# Patient Record
Sex: MALE | URBAN - METROPOLITAN AREA
[De-identification: names, ages, dates, MRNs, and addresses within clinical notes are randomized per-mention and may not be internally consistent; named-entity substitution may affect disease eponyms.]

---

## 2019-02-26 ENCOUNTER — IMPORTED ENCOUNTER (OUTPATIENT)
Dept: URBAN - METROPOLITAN AREA CLINIC 9 | Facility: CLINIC | Age: 34
End: 2019-02-26

## 2019-04-15 NOTE — PATIENT DISCUSSION
The patient was informed that with the [employer]nis Multifocal lens, mid-range vision will not be as strong as the distance and near vision and they may require glasses for mid-range activities such as a desktop computer. Side effects, specifically halos, reduced contrast, and a 1 in 500 exchange rate due to failure to adapt to the lens were discussed as was the need for enhancement in some cases. The patient elects TMF +3.25 O*, goal of emmetropia.

## 2019-04-15 NOTE — PATIENT DISCUSSION
The patient expressed a desire to see through the full range of vision from distance, to middle, to near without glasses. The limitations of advanced lens technology were reviewed and the recommendation was made for an extended depth of focus lens (Symfony) in combination with a multifocal lens. Patient understands that each lens will provide only 2 of the 3 ranges of vision. Side effects, specifically halos, reduced contrast, and a 1 in 500 exchange rate due to failure to adapt to the lens were discussed as was the need for enhancement in some cases. The patient elects to proceed with Refracitve Lens Exchange with Symfony OS, goal of emmetropia.

## 2019-05-01 NOTE — PATIENT DISCUSSION
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  The patient would like to proceed with refractive lens exchange in the second eye as scheduled.  The patient elects  TMF +325 IOL OD, goal of emmetropia.

## 2019-05-07 NOTE — PATIENT DISCUSSION
Patient advised of the right to post-operative care by the surgeon. Patient is fully informed of, and agreed to, co-management with their primary optometric physician. Post-operative care by the surgeon is not medically necessary and co-management is clinically appropriate. Patient has received itemization of fees related to cataract surgery. Transfer of care letter completed for the patient. Transfer care of the Right eye to Dr. Elizabeth Resendiz on 05/07/2019. Patient instructed to call immediately if any new distortion, blurring, decreased vision or eye pain.

## 2020-03-11 ENCOUNTER — IMPORTED ENCOUNTER (OUTPATIENT)
Dept: URBAN - METROPOLITAN AREA CLINIC 9 | Facility: CLINIC | Age: 35
End: 2020-03-11

## 2020-06-15 NOTE — PROCEDURE NOTE: SURGICAL
<p>Prior to commencing surgery patient identification, surgical procedure, site, and side were confirmed by Dr. Caryle Andrews. &nbsp; Following topical proparacaine anesthesia, the patient was positioned at the YAG laser, a contact lens coupled to the cornea of the right eye with methylcellulose and an axial posterior capsulotomy performed without complication using 2.8 Mj x 32. Attention was then turned to the left eye and a contact lens coupled to the cornea of the left eye with methylcellulose and an axial posterior capsulotomy performed without complication using 2.6 Mj x 22. One drop of Alphagan was instilled in both eyes and the patient returned to the holding area having tolerated the procedure well and without complication. </p><p>MRN:[id number]</p>

## 2021-04-07 ENCOUNTER — IMPORTED ENCOUNTER (OUTPATIENT)
Dept: URBAN - METROPOLITAN AREA CLINIC 9 | Facility: CLINIC | Age: 36
End: 2021-04-07

## 2021-04-07 PROBLEM — H01.021: Noted: 2021-04-07

## 2021-04-07 PROBLEM — H01.024: Noted: 2021-04-07

## 2021-04-07 PROBLEM — H25.013: Noted: 2021-04-07

## 2021-10-16 ASSESSMENT — VISUAL ACUITY
OD_CC: 20/50 -2 SN
OS_CC: 20/50 -2 SN
OS_CC: 20/50 + SN
OS_CC: 20/50 SN
OS_CC: 20/60 + SN
OD_CC: 20/60 - SN
OD_CC: 20/50 SN
OS_CC: 20/50 + SN
OD_CC: 20/50 - SN
OS_CC: 20/40 - SN

## 2021-10-16 ASSESSMENT — TONOMETRY
OD_IOP_MMHG: 17
OD_IOP_MMHG: 15
OD_IOP_MMHG: 13
OS_IOP_MMHG: 14
OS_IOP_MMHG: 17
OS_IOP_MMHG: 16

## 2022-06-17 RX ORDER — FLUOXETINE 10 MG/1
CAPSULE ORAL
COMMUNITY

## 2022-06-17 RX ORDER — LEVOTHYROXINE SODIUM 0.15 MG/1
TABLET ORAL
COMMUNITY
End: 2022-09-19 | Stop reason: DRUGHIGH

## 2022-09-19 PROBLEM — E78.00 ELEVATED CHOLESTEROL: Status: ACTIVE | Noted: 2022-09-19

## 2022-09-19 PROBLEM — I34.0 MITRAL REGURGITATION: Status: ACTIVE | Noted: 2022-09-19

## 2022-09-19 PROBLEM — Q24.9 CONGENITAL ANOMALY OF HEART: Status: ACTIVE | Noted: 2022-09-19

## 2022-09-19 PROBLEM — I73.9 ARTERIAL INSUFFICIENCY OF LOWER EXTREMITY (HCC): Status: ACTIVE | Noted: 2022-09-19

## 2022-09-19 PROBLEM — Q90.9 DOWN'S SYNDROME: Status: ACTIVE | Noted: 2022-09-19

## 2022-09-19 PROBLEM — E03.9 HYPOTHYROIDISM, ACQUIRED: Status: ACTIVE | Noted: 2022-09-19

## 2022-09-19 PROBLEM — G47.33 OBSTRUCTIVE SLEEP APNEA: Status: ACTIVE | Noted: 2022-09-19

## 2022-11-06 PROBLEM — J45.901 MILD ASTHMA WITH ACUTE EXACERBATION: Status: ACTIVE | Noted: 2022-11-06

## 2022-11-06 PROBLEM — J11.1 INFLUENZA: Status: ACTIVE | Noted: 2022-11-06

## 2022-11-06 PROBLEM — J98.01 BRONCHOSPASM: Status: ACTIVE | Noted: 2022-11-06

## 2022-11-06 PROBLEM — R09.02 HYPOXEMIA: Status: ACTIVE | Noted: 2022-11-06

## 2022-11-06 PROBLEM — Q21.20 ATRIOVENTRICULAR SEPTAL DEFECT (AVSD): Status: ACTIVE | Noted: 2018-04-18

## 2022-11-06 PROBLEM — J18.9 PNEUMONIA DUE TO INFECTIOUS ORGANISM: Status: ACTIVE | Noted: 2022-11-06

## 2022-11-06 PROBLEM — F32.A DEPRESSIVE DISORDER: Status: ACTIVE | Noted: 2022-11-06

## 2022-11-06 PROBLEM — R11.10 POST-TUSSIVE EMESIS: Status: ACTIVE | Noted: 2022-11-06

## 2022-11-06 PROBLEM — J11.00 PNEUMONIA AND INFLUENZA: Status: ACTIVE | Noted: 2022-11-06

## 2024-02-16 ENCOUNTER — ESTABLISHED PATIENT (OUTPATIENT)
Facility: LOCATION | Age: 39
End: 2024-02-16

## 2024-02-16 DIAGNOSIS — H25.013: ICD-10-CM

## 2024-02-16 DIAGNOSIS — H01.021: ICD-10-CM

## 2024-02-16 DIAGNOSIS — H50.00: ICD-10-CM

## 2024-02-16 PROCEDURE — 92014 COMPRE OPH EXAM EST PT 1/>: CPT

## 2024-02-16 PROCEDURE — 92015 DETERMINE REFRACTIVE STATE: CPT

## 2024-02-16 ASSESSMENT — VISUAL ACUITY
OD_CC: 20/50
OS_CC: 20/50

## 2024-02-16 ASSESSMENT — TONOMETRY
OS_IOP_MMHG: 16
OD_IOP_MMHG: 16

## 2025-05-27 ENCOUNTER — COMPREHENSIVE EXAM (OUTPATIENT)
Age: 40
End: 2025-05-27

## 2025-05-27 DIAGNOSIS — H50.00: ICD-10-CM

## 2025-05-27 DIAGNOSIS — H01.024: ICD-10-CM

## 2025-05-27 DIAGNOSIS — H25.013: ICD-10-CM

## 2025-05-27 DIAGNOSIS — H01.021: ICD-10-CM

## 2025-05-27 PROCEDURE — 92014 COMPRE OPH EXAM EST PT 1/>: CPT

## 2025-05-27 PROCEDURE — 92015 DETERMINE REFRACTIVE STATE: CPT
